# Patient Record
Sex: MALE | Race: WHITE | NOT HISPANIC OR LATINO | URBAN - NONMETROPOLITAN AREA
[De-identification: names, ages, dates, MRNs, and addresses within clinical notes are randomized per-mention and may not be internally consistent; named-entity substitution may affect disease eponyms.]

---

## 2019-03-11 ENCOUNTER — IMPORTED ENCOUNTER (OUTPATIENT)
Dept: URBAN - NONMETROPOLITAN AREA CLINIC 1 | Facility: CLINIC | Age: 74
End: 2019-03-11

## 2019-03-11 PROBLEM — E11.9: Noted: 2018-10-30

## 2019-03-11 PROBLEM — H52.4: Noted: 2017-12-19

## 2019-03-11 PROBLEM — H25.11: Noted: 2018-10-30

## 2019-03-11 PROBLEM — H47.013: Noted: 2019-03-11

## 2019-03-11 PROBLEM — H35.3132: Noted: 2019-03-11

## 2019-03-11 PROBLEM — Z96.1: Noted: 2018-10-30

## 2019-03-11 PROCEDURE — 92014 COMPRE OPH EXAM EST PT 1/>: CPT

## 2019-03-11 PROCEDURE — 92134 CPTRZ OPH DX IMG PST SGM RTA: CPT

## 2019-03-11 NOTE — PATIENT DISCUSSION
Ischemic Optic Neuropathy OU- Discussed diagnosis in detail with patient- Patient reports no having headaches or jaw aches or weight loss/ gain- OCT done previously shows Superior / Inferior NFL thinning and OS shows Inferior NFL thinningand borderline Superior NFL thinning - OCT MAC done today ordereded by Dr. 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  Patient is to contact our office if any changes are noted from today- Optos done in the past baseline: shows stable OU- OCT done today shows ARMD with slight progression noted OU  - Continue to [place] OD - Discussed diagnosis in detail with patient- Discussed signs and symptoms of progression- Patient is stable at this time  - Discussed UV protection- No treatment needed at this time - Continue to monitor NIDDM since 2005- Discussed diagnosis in detail with patient- Stressed importance of good blood sugar control- Recommend no soda- No BDR seen on today's exam- Letter to [employer]- Continue to monitorPseudophakia OS S/P Yag PC OS - Discussed diagnosis in detail with patient- Patient is stable at this time - D/C post op drops at this time - Good central opening- Continue to monitorPresbyopia OU- Discussed diagnosis in detail with patient- Continue to monitor; Dr's Notes: OCT 3/11/19 of MACOCT 10/30/18 of nerve VF 6/12/17Optos 12/19/17

## 2019-09-09 ENCOUNTER — IMPORTED ENCOUNTER (OUTPATIENT)
Dept: URBAN - NONMETROPOLITAN AREA CLINIC 1 | Facility: CLINIC | Age: 74
End: 2019-09-09

## 2019-09-09 PROCEDURE — 92014 COMPRE OPH EXAM EST PT 1/>: CPT

## 2019-09-09 PROCEDURE — 92250 FUNDUS PHOTOGRAPHY W/I&R: CPT

## 2019-09-09 NOTE — PATIENT DISCUSSION
ARMD - Dry OU - Discussed diagnosis in detail with patient- Recommend continue taking eye vitamins daily such as Preservision- Continue Amsler grid monitoring daily. Patient is to contact our office if any changes are noted from today- Optos done today shows drusen OU shows slight progression. Patient's vision has drooped off since last visit.  - OCT done previously shows ARMD with slight progression noted OU  - Continue to monitor- RTC 4 months F/U with OCT MAC and ONH (per Dr. Chao [place] Optic Neuropathy OU- Discussed diagnosis in detail with patient- Patient reports no having headaches or jaw aches or weight loss/ gain- OCT done previously shows Superior / Inferior NFL thinning and OS shows Inferior NFL thinningand borderline Superior NFL thinning - OCT MAC done previously ordereded by Dr. 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- Optos done today shows stable OU from previous- Continue to [facility] OD - Discussed diagnosis in detail with patient- Discussed signs and symptoms of progression- Discussed UV protection- No treatment needed at this time - Continue to monitor NIDDM since 2005- Discussed diagnosis in detail with patient- Stressed importance of good blood sugar control- Recommend no soda- No diabetic retinopathy seen on today's exam- Letter to [employer]- Continue to monitorPseudophakia OS - Discussed diagnosis in detail with patient- S/P YAG PC OS - good central opening - Continue to monitorPresbyopia OU- Discussed diagnosis in detail with patient- Patient DEFERS MR today quick MR done by tech today- Continue to monitor; Dr's Notes: OCT 3/11/19 of MACOCT 10/30/18 of nerve VF 6/12/17Optos 9/9/19MR DEFER 9/9/19 upright (90 degrees)

## 2019-11-11 ENCOUNTER — IMPORTED ENCOUNTER (OUTPATIENT)
Dept: URBAN - NONMETROPOLITAN AREA CLINIC 1 | Facility: CLINIC | Age: 74
End: 2019-11-11

## 2019-11-11 PROBLEM — H47.013: Noted: 2019-11-11

## 2019-11-11 PROBLEM — H35.3132: Noted: 2019-11-11

## 2019-11-11 PROBLEM — H25.11: Noted: 2019-11-11

## 2019-11-11 PROBLEM — H52.4: Noted: 2019-11-11

## 2019-11-11 PROBLEM — E11.9: Noted: 2019-11-11

## 2019-11-11 PROBLEM — Z96.1: Noted: 2019-11-11

## 2019-11-11 PROCEDURE — 92015 DETERMINE REFRACTIVE STATE: CPT

## 2019-11-11 NOTE — PATIENT DISCUSSION
ARMD - Dry OU - Discussed diagnosis in detail with patient- Recommend continue taking eye vitamins daily such as Preservision- Continue Amsler grid monitoring daily. Patient is to contact our office if any changes are noted from today- Optos done today shows drusen OU shows slight progression. Patient's vision has drooped off since last visit. - OCT done previously shows ARMD with slight progression noted OU  - Continue to monitor- RTC 4 months F/U with OCT MAC and ONH (per Dr. Terry Arreola Optic Neuropathy OU- Discussed diagnosis in detail with patient- Patient reports no having headaches or jaw aches or weight loss/ gain- OCT done previously shows Superior / Inferior NFL thinning and OS shows Inferior NFL thinningand borderline Superior NFL thinning - OCT MAC done previously ordereded by Dr. 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- Optos done today shows stable OU from previous- Continue to [facility] OD - Discussed diagnosis in detail with patient- Discussed signs and symptoms of progression- Discussed UV protection- No treatment needed at this time - Continue to monitor NIDDM since 2005- Discussed diagnosis in detail with patient- Stressed importance of good blood sugar control- Recommend no soda- No diabetic retinopathy seen on today's exam- Letter to [employer]- Continue to monitorPseudophakia OS - Discussed diagnosis in detail with patient- S/P YAG PC OS - good central opening - Continue to monitorPresbyopia OU- Discussed diagnosis in detail with patient- MR done today by Dr. Aggie Porras and new glasses Rx given.  Per patient's request - Continue to monitor; Dr's Notes: OCT 3/11/19 of MACOCT 10/30/18 of nerve VF 6/12/17Optos 9/9/19MR DEFER 9/9/19

## 2019-11-25 ENCOUNTER — IMPORTED ENCOUNTER (OUTPATIENT)
Dept: URBAN - NONMETROPOLITAN AREA CLINIC 1 | Facility: CLINIC | Age: 74
End: 2019-11-25

## 2019-11-25 NOTE — PATIENT DISCUSSION
RX. 12/05/19:  I backed off Rx to +0.75 in OS. .patient request and left at front. ..explaianed to patient that I checked it this way before but showed no difference. .. MAKING PATIENT HAPPYRX check - Discussed diagnosis in detail with patient - MR done today by Dr. Jonathan Alcantar did not find a RX change. Explained to patient that can not make his vision any better due to the ARMD - Continue to monitor --------------------------previous notes------------------------ARMD - Dry OU - Discussed diagnosis in detail with patient- Recommend continue taking eye vitamins daily such as Preservision- Continue Amsler grid monitoring daily. Patient is to contact our office if any changes are noted from today- Optos done today shows drusen OU shows slight progression. Patient's vision has dropped off since last visit. - OCT done previously shows ARMD with slight progression noted OU  - Continue to monitor- RTC 4 months F/U with OCT MAC and ONH (per Dr. Reyes Bel[place] Optic Neuropathy OU- Discussed diagnosis in detail with patient- Patient reports no having headaches or jaw aches or weight loss/ gain- OCT done previously shows Superior / Inferior NFL thinning and OS shows Inferior NFL thinningand borderline Superior NFL thinning - OCT MAC done previously ordereded by Dr. 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- Optos done today shows stable OU from previous- Continue to [facility] OD - Discussed diagnosis in detail with patient- Discussed signs and symptoms of progression- Discussed UV protection- No treatment needed at this time - Continue to monitor NIDDM since 2005- Discussed diagnosis in detail with patient- Stressed importance of good blood sugar control- Recommend no soda- No diabetic retinopathy seen on today's exam- Letter to [employer]- Continue to monitorPseudophakia OS - Discussed diagnosis in detail with patient- S/P YAG PC OS - good central opening - Continue to monitorPresbyopia OU- Discussed diagnosis in detail with patient- MR done today by Dr. Jonathan Alcantar and new glasses Rx given.  Per patient's request - Continue to monitor; Dr's Notes: OCT 3/11/19 of MACOCT 10/30/18 of nerve VF 6/12/17Optos 9/9/19MR DEFER 9/9/19

## 2020-01-06 ENCOUNTER — IMPORTED ENCOUNTER (OUTPATIENT)
Dept: URBAN - NONMETROPOLITAN AREA CLINIC 1 | Facility: CLINIC | Age: 75
End: 2020-01-06

## 2020-01-06 PROBLEM — H25.11: Noted: 2020-01-06

## 2020-01-06 PROBLEM — Z96.1: Noted: 2020-01-06

## 2020-01-06 PROBLEM — H52.4: Noted: 2020-01-06

## 2020-01-06 PROBLEM — E11.9: Noted: 2020-01-06

## 2020-01-06 PROBLEM — H35.3132: Noted: 2020-01-06

## 2020-01-06 PROBLEM — H47.013: Noted: 2020-01-06

## 2020-01-06 PROCEDURE — 92134 CPTRZ OPH DX IMG PST SGM RTA: CPT

## 2020-01-06 PROCEDURE — 99213 OFFICE O/P EST LOW 20 MIN: CPT

## 2020-01-06 NOTE — PATIENT DISCUSSION
ARMD - Dry OU - Discussed diagnosis in detail with patient- Recommend continue taking eye vitamins daily such as Preservision- Continue Amsler grid monitoring daily. Patient is to contact our office if any changes are noted from today- Optos done previously shows drusen OU shows slight progression. Patient's vision has dropped off since last visit.  - OCT done today shows ARMD with slight progression noted OU  - Continue to monitor- RTC 6 months F/U Ischemic Optic Neuropathy OU- Discussed diagnosis in detail with patient- Patient reports no having headaches or jaw aches or weight loss/ gain- OCT done today UTO OD and OS Superior and Inferior NFL thinning with slight progression - OCT MAC done previously ordereded by Dr. 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- Optos done previously shows stable OU from previous- Continue to monitor- RTC 6 months F/U with VF [place] OD - Discussed diagnosis in detail with patient- Discussed signs and symptoms of progression- Discussed UV protection- No treatment needed at this time - Continue to monitor NIDDM since 2005- Discussed diagnosis in detail with patient- Stressed importance of good blood sugar control- Recommend no soda- No diabetic retinopathy seen on today's exam- Letter to [employer]- Continue to monitorPseudophakia OS - Discussed diagnosis in detail with patient- S/P YAG PC OS - good central opening - Continue to monitorPresbyopia OU- Discussed diagnosis in detail with patient- Continue to monitor; Dr's Notes: OCT 3/11/19 of MACOCT 10/30/18 of nerve VF 6/12/17Optos 9/9/19MR DEFER 9/9/19

## 2020-07-08 NOTE — PROCEDURE NOTE: CLINICAL
PROCEDURE NOTE: Sub-Tenon’s Kenalog (Triamcinolone) Right Lower Lid. Diagnosis: Chalazion. Explained need for kenalog injection of chronic inflammation. Risks and benefits discussed and patient desires to proceed. 50/50 mix of kenalog and flurouracil drawn up. 0.10 CC of kenalog and Flurouracil injected to RLL without complications. Flurouracil LKX#[id number] exp 09/20. Kenalog lot #[id number] EXP 09/20. Julio Kesselr

## 2020-07-08 NOTE — PATIENT DISCUSSION
Explained need for kenalog injection of chronic inflammation. Risks and benefits discussed and patient desires to proceed.

## 2020-08-18 ENCOUNTER — IMPORTED ENCOUNTER (OUTPATIENT)
Dept: URBAN - NONMETROPOLITAN AREA CLINIC 1 | Facility: CLINIC | Age: 75
End: 2020-08-18

## 2020-08-18 PROBLEM — Z96.1: Noted: 2020-01-06

## 2020-08-18 PROBLEM — H52.4: Noted: 2020-01-06

## 2020-08-18 PROBLEM — E11.9: Noted: 2020-01-06

## 2020-08-18 PROBLEM — H47.013: Noted: 2020-08-18

## 2020-08-18 PROBLEM — H25.11: Noted: 2020-01-06

## 2020-08-18 PROBLEM — H35.3132: Noted: 2020-08-18

## 2020-08-18 PROCEDURE — 99213 OFFICE O/P EST LOW 20 MIN: CPT

## 2020-08-18 PROCEDURE — 92083 EXTENDED VISUAL FIELD XM: CPT

## 2020-08-18 NOTE — PATIENT DISCUSSION
Ischemic Optic Neuropathy OU- Discussed diagnosis in detail with patient-IOP done toda 08 OD and 09 OS - Patient reports no having headaches or jaw aches or weight loss/ gain- OCT done previously UTO OD and OS Superior and Inferior NFL thinning with   slight progression - OCT MAC done previously ordereded by Dr. Marco Jeong shows no changes with the Ischemic Optic Neuropathy shows only changes in ARMD - VF done today shows OS shows dense inferior arcuate. Almost identical to last VF done in 2017. Stable @ this time. No need to repeat VF in the future. - Optos done previously shows stable OU from previous- Continue to monitorARMD - Dry OU - Discussed diagnosis in detail with patient- Recommend continue taking eye vitamins daily such as Preservision- Continue Amsler grid monitoring daily. Patient is to contact our office if any changes are noted from today- Optos done previously shows drusen OU shows slight progression. Patient's vision has dropped off since last visit.  - OCT done previously shows ARMD with slight progression noted OU  - Continue to monitor- RTC 6 months F/U [place] OD - Discussed diagnosis in detail with patient- Discussed signs and symptoms of progression- Discussed UV protection- No treatment needed at this time - Continue to monitor NIDDM since 2005- Discussed diagnosis in detail with patient- Stressed importance of good blood sugar control- Recommend no soda- No diabetic retinopathy seen on today's exam- Letter to [employer]- Continue to monitorPseudophakia OS - Discussed diagnosis in detail with patient- S/P YAG PC OS - good central opening - Continue to monitorPresbyopia OU- Discussed diagnosis in detail with patient- Continue to monitor; Dr's Notes: OCT 3/11/19 of MACOCT 10/30/18 of nerve VF 8/18/20- last one to be done per [facility] Optos 9/9/19MR DEFER 9/9/19

## 2021-02-17 ENCOUNTER — IMPORTED ENCOUNTER (OUTPATIENT)
Dept: URBAN - NONMETROPOLITAN AREA CLINIC 1 | Facility: CLINIC | Age: 76
End: 2021-02-17

## 2021-02-17 PROBLEM — Z96.1: Noted: 2020-01-06

## 2021-02-17 PROBLEM — H52.4: Noted: 2020-01-06

## 2021-02-17 PROBLEM — H25.11: Noted: 2020-01-06

## 2021-02-17 PROBLEM — H35.3132: Noted: 2021-02-17

## 2021-02-17 PROBLEM — H47.013: Noted: 2021-02-17

## 2021-02-17 PROBLEM — E11.9: Noted: 2020-01-06

## 2021-02-17 PROCEDURE — 92134 CPTRZ OPH DX IMG PST SGM RTA: CPT

## 2021-02-17 PROCEDURE — 92014 COMPRE OPH EXAM EST PT 1/>: CPT

## 2021-02-17 NOTE — PATIENT DISCUSSION
Ischemic Optic Neuropathy OU- Discussed diagnosis in detail with patient- IOP done today 08 OD and 08 OS - Patient reports no having headaches or jaw aches or weight loss/ gain- OCT done previously UTO OD and OS Superior and Inferior NFL thinning with   slight progression - OCT MAC done previously ordereded by Dr. Buck Verdin shows no changes with the Ischemic Optic Neuropathy shows only changes in ARMD - VF done previously shows OS shows dense inferior arcuate. Almost identical to last VF done in 2017. Stable @ this time. No need to repeat VF in the future. - Optos done previously shows stable OU from previous- Continue to monitorARMD - Dry OU - Discussed diagnosis in detail with patient- Recommend continue taking eye vitamins daily such as Preservision- Continue Amsler grid monitoring daily. Patient is to contact our office if any changes are noted from today- Optos done previously shows drusen OU shows slight progression. Patient's vision has dropped off since last visit.  - OCT done today shows Drusen and mottling OU but stable at this time   - Continue to monitor- RTC 6 months F/U with Optos and MR Cataracts OD - Discussed diagnosis in detail with patient- Discussed signs and symptoms of progression- Discussed UV protection- No treatment needed at this time - Continue to monitor NIDDM since 2005- Discussed diagnosis in detail with patient- Stressed importance of good blood sugar control- Recommend no soda- No diabetic retinopathy seen on today's exam- Letter to [employer]- Continue to monitorPseudophakia OS - Discussed diagnosis in detail with patient- S/P YAG PC OS - good central opening - Continue to monitorPresbyopia OU- Discussed diagnosis in detail with patient- Continue to monitor; Dr's Notes: OCT 2/17/21 of MACOCT 10/30/18 of nerve VF 8/18/20- last one to be done per [facility] Optos 9/9/19MR DEFER 9/9/19

## 2021-08-19 ENCOUNTER — IMPORTED ENCOUNTER (OUTPATIENT)
Dept: URBAN - NONMETROPOLITAN AREA CLINIC 1 | Facility: CLINIC | Age: 76
End: 2021-08-19

## 2021-08-19 PROCEDURE — 92250 FUNDUS PHOTOGRAPHY W/I&R: CPT

## 2021-08-19 PROCEDURE — 99214 OFFICE O/P EST MOD 30 MIN: CPT

## 2022-01-13 ENCOUNTER — IMPORTED ENCOUNTER (OUTPATIENT)
Dept: URBAN - NONMETROPOLITAN AREA CLINIC 1 | Facility: CLINIC | Age: 77
End: 2022-01-13

## 2022-01-13 PROCEDURE — 92133 CPTRZD OPH DX IMG PST SGM ON: CPT

## 2022-01-13 PROCEDURE — 99213 OFFICE O/P EST LOW 20 MIN: CPT

## 2022-01-13 NOTE — PATIENT DISCUSSION
Ischemic Optic Neuropathy OU- Discussed diagnosis in detail with patient- IOP done today 10 OD and 11 OS - Patient reports no having headaches or jaw aches or weight loss/ gain- OCT done today UTO OD and OS Superior and Inferior NFL thinning with no changes or progression from previous- OCT MAC done today ordereded by Dr. Barrett Standard shows no changes with the Ischemic Optic Neuropathy shows only changes in ARMD but nothing from previous- VF done previously shows OS shows dense inferior arcuate. Almost identical to last VF done in 2017. Stable @ this time. No need to repeat VF in the future. - Optos done today shows stable OU from previous-Pt states he feels ok & denies feeling sick or weak & states his sugars are stable at this time. -Explained to patient & wife that [place] issues are result 2nd to dx . -No treatment needed @ this time. - Continue to monitorARMD - Dry OU - Discussed diagnosis in detail with patient- Recommend continue taking eye vitamins daily such as Preservision- Continue Amsler grid monitoring daily.  Patient is to contact our office if any    changes are noted from today- Optos done previous shows drusen OU shows - OCT done today shows Drusen and mottling OU but stable at this time   - Continue to monitor NOTES BELOW FROM PREVIOUS: Cataracts OD - Discussed diagnosis in detail with patient- Discussed signs and symptoms of progression- Discussed UV protection- No treatment needed at this time - Continue to monitor NIDDM since 2005- Discussed diagnosis in detail with patient- Stressed importance of good blood sugar control- Recommend no soda- No diabetic retinopathy seen on today's exam- Letter to [employer]- Continue to monitorPseudophakia OS - Discussed diagnosis in detail with patient- S/P YAG PC OS - good central opening - Continue to monitorPresbyopia OU- Discussed diagnosis in detail with patient- NEW glasses RX given today- Continue to monitor; Dr's Notes: OCT MAC 1/13/22 OCT Elmo Belcher 72  1/13/22 VF 8/18/20- last one to be done per [facility] Optos 9/9/19MR DEFER 9/9/19

## 2022-04-10 ASSESSMENT — TONOMETRY
OS_IOP_MMHG: 08
OS_IOP_MMHG: 09
OD_IOP_MMHG: 10
OD_IOP_MMHG: 08
OS_IOP_MMHG: 10
OS_IOP_MMHG: 08
OS_IOP_MMHG: 08
OD_IOP_MMHG: 10
OS_IOP_MMHG: 09
OD_IOP_MMHG: 08
OD_IOP_MMHG: 10
OS_IOP_MMHG: 11

## 2022-04-10 ASSESSMENT — VISUAL ACUITY
OS_SC: 20/200
OS_CC: 20/63
OS_SC: 20/200
OS_SC: 20/100
OS_SC: 20/200
OS_PH: 20/200+2
OS_SC: 20/63
OS_CC: 20/50

## 2022-04-27 NOTE — PATIENT DISCUSSION
Discussed the importance of blood sugar control in the prevention of ocular complications. Mauc Instructions: By selecting yes to the question below the MAUC number will be added into the note.  This will be calculated automatically based on the diagnosis chosen, the size entered, the body zone selected (H,M,L) and the specific indications you chose. You will also have the option to override the Mohs AUC if you disagree with the automatically calculated number and this option is found in the Case Summary tab.

## 2022-08-22 ENCOUNTER — COMPREHENSIVE EXAM (OUTPATIENT)
Dept: URBAN - NONMETROPOLITAN AREA CLINIC 1 | Facility: CLINIC | Age: 77
End: 2022-08-22

## 2022-08-22 DIAGNOSIS — H35.3132: ICD-10-CM

## 2022-08-22 DIAGNOSIS — H52.4: ICD-10-CM

## 2022-08-22 PROCEDURE — 92134 CPTRZ OPH DX IMG PST SGM RTA: CPT

## 2022-08-22 PROCEDURE — 92015 DETERMINE REFRACTIVE STATE: CPT

## 2022-08-22 PROCEDURE — 99214 OFFICE O/P EST MOD 30 MIN: CPT

## 2022-08-22 ASSESSMENT — VISUAL ACUITY: OS_SC: 20/100

## 2022-08-22 ASSESSMENT — TONOMETRY
OD_IOP_MMHG: 12
OS_IOP_MMHG: 12

## 2022-08-22 NOTE — PATIENT DISCUSSION
Discussed diagnosis in detail with patient- IOP done today 10 OD and 11 OS - Patient reports no having headaches or jaw aches or weight loss/ gain- OCT done today UTO OD and OS Superior and Inferior NFL thinning with no changes or progression from previous- OCT MAC done today ordereded by Dr. Paco Sifuentes shows no changes with the Ischemic Optic Neuropathy shows only changes in ARMD but nothing from previous- VF done previously shows OS shows dense inferior arcuate. Almost identical to last VF done in 2017. Stable @ this time. No need to repeat VF in the future. - Optos done today shows stable OU from previous-Pt states he feels ok & denies feeling sick or weak & states his sugars are stable at this time. -Explained to patient & wife that [place] issues are result 2nd to dx . -No treatment needed @ this time. - Continue to monitor.

## 2023-12-12 ENCOUNTER — FOLLOW UP (OUTPATIENT)
Dept: URBAN - NONMETROPOLITAN AREA CLINIC 1 | Facility: CLINIC | Age: 78
End: 2023-12-12

## 2023-12-12 DIAGNOSIS — H52.4: ICD-10-CM

## 2023-12-12 DIAGNOSIS — E11.9: ICD-10-CM

## 2023-12-12 DIAGNOSIS — H35.3132: ICD-10-CM

## 2023-12-12 DIAGNOSIS — H47.013: ICD-10-CM

## 2023-12-12 PROCEDURE — 92134 CPTRZ OPH DX IMG PST SGM RTA: CPT

## 2023-12-12 PROCEDURE — 99214 OFFICE O/P EST MOD 30 MIN: CPT

## 2023-12-12 ASSESSMENT — TONOMETRY
OS_IOP_MMHG: 11
OD_IOP_MMHG: 11

## 2023-12-12 ASSESSMENT — VISUAL ACUITY: OS_CC: 20/63-

## 2024-06-14 ENCOUNTER — FOLLOW UP (OUTPATIENT)
Dept: URBAN - NONMETROPOLITAN AREA CLINIC 1 | Facility: CLINIC | Age: 79
End: 2024-06-14

## 2024-06-14 DIAGNOSIS — E11.9: ICD-10-CM

## 2024-06-14 DIAGNOSIS — H35.3132: ICD-10-CM

## 2024-06-14 DIAGNOSIS — H47.013: ICD-10-CM

## 2024-06-14 PROCEDURE — 99213 OFFICE O/P EST LOW 20 MIN: CPT

## 2024-06-14 ASSESSMENT — TONOMETRY
OS_IOP_MMHG: 11
OD_IOP_MMHG: 11

## 2024-06-14 ASSESSMENT — VISUAL ACUITY
OS_CC: 20/50-2
OU_CC: 20/50-2

## 2025-05-15 ENCOUNTER — COMPREHENSIVE EXAM (OUTPATIENT)
Age: 80
End: 2025-05-15

## 2025-05-15 DIAGNOSIS — H35.3132: ICD-10-CM

## 2025-05-15 DIAGNOSIS — Z96.1: ICD-10-CM

## 2025-05-15 DIAGNOSIS — H52.4: ICD-10-CM

## 2025-05-15 PROCEDURE — 92015 DETERMINE REFRACTIVE STATE: CPT

## 2025-05-15 PROCEDURE — 92014 COMPRE OPH EXAM EST PT 1/>: CPT

## 2025-05-15 PROCEDURE — 92134 CPTRZ OPH DX IMG PST SGM RTA: CPT | Mod: NC
